# Patient Record
Sex: MALE | Race: BLACK OR AFRICAN AMERICAN | Employment: FULL TIME | URBAN - METROPOLITAN AREA
[De-identification: names, ages, dates, MRNs, and addresses within clinical notes are randomized per-mention and may not be internally consistent; named-entity substitution may affect disease eponyms.]

---

## 2020-03-10 ENCOUNTER — ED HISTORICAL/CONVERTED ENCOUNTER (OUTPATIENT)
Dept: OTHER | Age: 40
End: 2020-03-10

## 2022-07-12 ENCOUNTER — HOSPITAL ENCOUNTER (EMERGENCY)
Age: 42
Discharge: HOME | End: 2022-07-12
Attending: EMERGENCY MEDICINE

## 2022-07-12 ENCOUNTER — APPOINTMENT (OUTPATIENT)
Dept: GENERAL RADIOLOGY | Age: 42
End: 2022-07-12
Attending: EMERGENCY MEDICINE

## 2022-07-12 VITALS
HEART RATE: 78 BPM | TEMPERATURE: 98.5 F | RESPIRATION RATE: 18 BRPM | OXYGEN SATURATION: 99 % | SYSTOLIC BLOOD PRESSURE: 122 MMHG | BODY MASS INDEX: 24.44 KG/M2 | DIASTOLIC BLOOD PRESSURE: 72 MMHG | HEIGHT: 69 IN | WEIGHT: 165 LBS

## 2022-07-12 DIAGNOSIS — U07.1 COVID: Primary | ICD-10-CM

## 2022-07-12 LAB
COVID-19 RAPID TEST, COVR: DETECTED
FLUAV AG NPH QL IA: NEGATIVE
FLUBV AG NOSE QL IA: NEGATIVE
SARS-COV-2, COV2: NORMAL

## 2022-07-12 PROCEDURE — 87635 SARS-COV-2 COVID-19 AMP PRB: CPT

## 2022-07-12 PROCEDURE — 99283 EMERGENCY DEPT VISIT LOW MDM: CPT

## 2022-07-12 PROCEDURE — 87804 INFLUENZA ASSAY W/OPTIC: CPT

## 2022-07-12 PROCEDURE — 71046 X-RAY EXAM CHEST 2 VIEWS: CPT

## 2022-07-12 PROCEDURE — 74011250637 HC RX REV CODE- 250/637: Performed by: EMERGENCY MEDICINE

## 2022-07-12 RX ORDER — ACETAMINOPHEN 325 MG/1
1000 TABLET ORAL
Qty: 20 TABLET | Refills: 0 | Status: SHIPPED | OUTPATIENT
Start: 2022-07-12

## 2022-07-12 RX ORDER — ACETAMINOPHEN 500 MG
1000 TABLET ORAL ONCE
Status: COMPLETED | OUTPATIENT
Start: 2022-07-12 | End: 2022-07-12

## 2022-07-12 RX ORDER — BENZONATATE 100 MG/1
200 CAPSULE ORAL
Qty: 30 CAPSULE | Refills: 0 | Status: SHIPPED | OUTPATIENT
Start: 2022-07-12 | End: 2022-07-19

## 2022-07-12 RX ADMIN — ACETAMINOPHEN 1000 MG: 500 TABLET ORAL at 08:39

## 2022-07-12 NOTE — DISCHARGE INSTRUCTIONS
Thank you! Thank you for allowing me to care for you in the emergency department. I sincerely hope that you are satisfied with your visit today. It is my goal to provide you with excellent care. Below you will find a list of your labs and imaging from your visit today. Should you have any questions regarding these results please do not hesitate to call the emergency department. Labs -     Recent Results (from the past 12 hour(s))   INFLUENZA A & B AG (RAPID TEST)    Collection Time: 07/12/22  7:44 AM   Result Value Ref Range    Influenza A Antigen Negative Negative      Influenza B Antigen Negative Negative     SARS-COV-2    Collection Time: 07/12/22  7:45 AM   Result Value Ref Range    SARS-CoV-2 by PCR Nasopharyngeal     COVID-19 RAPID TEST    Collection Time: 07/12/22  7:45 AM   Result Value Ref Range    COVID-19 rapid test DETECTED (A) Not Detected         Radiologic Studies -   XR CHEST PA LAT   Final Result   No acute abnormality        CT Results  (Last 48 hours)      None          CXR Results  (Last 48 hours)                 07/12/22 0756  XR CHEST PA LAT Final result    Impression:  No acute abnormality       Narrative:  EXAM: XR CHEST PA LAT       INDICATION: congestion and fever       COMPARISON: None. FINDINGS: PA and lateral radiographs of the chest demonstrate clear lungs. The   cardiac and mediastinal contours and pulmonary vascularity are normal. The bones   and soft tissues are within normal limits. If you feel that you have not received excellent quality care or timely care, please ask to speak to the nurse manager. Please choose us in the future for your continued health care needs. ------------------------------------------------------------------------------------------------------------  The exam and treatment you received in the Emergency Department were for an urgent problem and are not intended as complete care.  It is important that you follow-up with a doctor, nurse practitioner, or physician assistant to:  (1) confirm your diagnosis,  (2) re-evaluation of changes in your illness and treatment, and  (3) for ongoing care. If your symptoms become worse or you do not improve as expected and you are unable to reach your usual health care provider, you should return to the Emergency Department. We are available 24 hours a day. Please take your discharge instructions with you when you go to your follow-up appointment. If you have any problem arranging a follow-up appointment, contact the Emergency Department immediately. If a prescription has been provided, please have it filled as soon as possible to prevent a delay in treatment. Read the entire medication instruction sheet provided to you by the pharmacy. If you have any questions or reservations about taking the medication due to side effects or interactions with other medications, please call your primary care physician or contact the ER to speak with the charge nurse. Make an appointment with your family doctor or the physician you were referred to for follow-up of this visit as instructed on your discharge paperwork, as this is a mandatory follow-up. Return to the ER if you are unable to be seen or if you are unable to be seen in a timely manner. If you have any problem arranging the follow-up visit, contact the Emergency Department immediately.

## 2022-07-12 NOTE — ED TRIAGE NOTES
Reports fever and chest congestion for a couple of days. States he has been taking Advil for the fever.

## 2022-07-12 NOTE — ED PROVIDER NOTES
EMERGENCY DEPARTMENT HISTORY AND PHYSICAL EXAM      Date: 7/12/2022  Patient Name: Annabel Jacobs    History of Presenting Illness     Chief Complaint   Patient presents with    Fever    Chest Congestion       History Provided By: Patient    HPI: Annabel Jacobs, 35 y.o. male with a past medical history significant No significant past medical history presents to the ED with cc of cough and fever. Symptoms been going on for about 3 days. He has been taking Advil with improvement of the fever. States the cough is worse first thing in the morning. No shortness of breath, rhinorrhea, sore throat or GI symptoms. No known sick contacts. Is fully vaccinated for COVID. There are no other complaints, changes, or physical findings at this time. PCP: None    No current facility-administered medications on file prior to encounter. No current outpatient medications on file prior to encounter. Past History     Past Medical History:  No past medical history on file. Past Surgical History:  No past surgical history on file. Family History:  No family history on file. Social History:  Social History     Tobacco Use    Smoking status: Not on file    Smokeless tobacco: Not on file   Substance Use Topics    Alcohol use: Not on file    Drug use: Not on file       Allergies:  No Known Allergies      Review of Systems     Review of Systems   Constitutional: Positive for fever. Negative for activity change and appetite change. HENT: Negative for rhinorrhea and sore throat. Eyes: Negative for visual disturbance. Respiratory: Positive for cough. Negative for shortness of breath. Cardiovascular: Negative for chest pain. Gastrointestinal: Negative for abdominal pain, diarrhea, nausea and vomiting. Genitourinary: Negative for dysuria. Musculoskeletal: Negative for arthralgias and myalgias. Skin: Negative for rash. Neurological: Negative for headaches.    Psychiatric/Behavioral: Negative for confusion. All other systems reviewed and are negative. Physical Exam     Physical Exam  Vitals and nursing note reviewed. Constitutional:       General: He is not in acute distress. Appearance: Normal appearance. HENT:      Head: Normocephalic and atraumatic. Eyes:      Pupils: Pupils are equal, round, and reactive to light. Cardiovascular:      Rate and Rhythm: Normal rate and regular rhythm. Pulses: Normal pulses. Heart sounds: Normal heart sounds. Pulmonary:      Effort: Pulmonary effort is normal.      Breath sounds: Normal breath sounds. Musculoskeletal:         General: No swelling or deformity. Skin:     Coloration: Skin is not pale. Findings: No rash. Neurological:      General: No focal deficit present. Mental Status: He is alert and oriented to person, place, and time. Psychiatric:         Mood and Affect: Mood normal.         Behavior: Behavior normal.         Lab and Diagnostic Study Results     Labs -     Recent Results (from the past 12 hour(s))   INFLUENZA A & B AG (RAPID TEST)    Collection Time: 07/12/22  7:44 AM   Result Value Ref Range    Influenza A Antigen Negative Negative      Influenza B Antigen Negative Negative     SARS-COV-2    Collection Time: 07/12/22  7:45 AM   Result Value Ref Range    SARS-CoV-2 by PCR Nasopharyngeal     COVID-19 RAPID TEST    Collection Time: 07/12/22  7:45 AM   Result Value Ref Range    COVID-19 rapid test DETECTED (A) Not Detected         Radiologic Studies -   @lastxrresult@  CT Results  (Last 48 hours)    None        CXR Results  (Last 48 hours)               07/12/22 0756  XR CHEST PA LAT Final result    Impression:  No acute abnormality       Narrative:  EXAM: XR CHEST PA LAT       INDICATION: congestion and fever       COMPARISON: None. FINDINGS: PA and lateral radiographs of the chest demonstrate clear lungs.  The   cardiac and mediastinal contours and pulmonary vascularity are normal. The bones   and soft tissues are within normal limits. Medical Decision Making   - I am the first provider for this patient. - I reviewed the vital signs, available nursing notes, past medical history, past surgical history, family history and social history. - Initial assessment performed. The patients presenting problems have been discussed, and they are in agreement with the care plan formulated and outlined with them. I have encouraged them to ask questions as they arise throughout their visit. Vital Signs-Reviewed the patient's vital signs. Patient Vitals for the past 12 hrs:   Temp Pulse Resp BP SpO2   07/12/22 0737 98.5 °F (36.9 °C) 78 18 122/72 99 %       Records Reviewed: Nursing Notes      ED Course:     ED Course as of 07/12/22 0951   Tue Jul 12, 2022   186 43-year-old male presents for evaluation for 3 days of cough and fever. Also has body aches. No sick contacts. Has been taking Advil with improvement of his temperature. Last dose was this morning. On exam he is well-appearing with normal vital signs. Lungs are clear to auscultation. Differential diagnosis includes viral syndrome including COVID or flu versus pneumonia versus other viral syndrome. Getting a chest x-ray as well as a COVID and flu test.  Disposition as per clinical course. Anticipate discharged home with outpatient follow-up. [LW]   0122 CXR unremarkable. [LW]   Santos. Discharged to home w isolation and return precautions. [LW]      ED Course User Index  [LW] Phuc Christensen MD         Disposition   Disposition: DC- Adult Discharges: All of the diagnostic tests were reviewed and questions answered. Diagnosis, care plan and treatment options were discussed. The patient understands the instructions and will follow up as directed. The patients results have been reviewed with them. They have been counseled regarding their diagnosis.   The patient verbally convey understanding and agreement of the signs, symptoms, diagnosis, treatment and prognosis and additionally agrees to follow up as recommended with their PCP in 24 - 48 hours. They also agree with the care-plan and convey that all of their questions have been answered. I have also put together some discharge instructions for them that include: 1) educational information regarding their diagnosis, 2) how to care for their diagnosis at home, as well a 3) list of reasons why they would want to return to the ED prior to their follow-up appointment, should their condition change. Discharged    DISCHARGE PLAN:  1. There are no discharge medications for this patient. 2.   Follow-up Information     Follow up With Specialties Details Why [facility] EMERGENCY DEPT Emergency Medicine  As needed [address] [address]  [address] [facility]    [address] [address]  [phone]        3. Return to ED if worse   4. Current Discharge Medication List      START taking these medications    Details   acetaminophen (TYLENOL) 325 mg tablet Take 3 Tablets by mouth every six (6) hours as needed for Pain or Fever. Qty: 20 Tablet, Refills: 0  Start date: 7/12/2022      benzonatate (Tessalon Perles) 100 mg capsule Take 2 Capsules by mouth three (3) times daily as needed for Cough for up to 7 days. Qty: 30 Capsule, Refills: 0  Start date: 7/12/2022, End date: 7/19/2022               Diagnosis     Clinical Impression:   1. COVID        Attestations:    Rosalind King MD    Please note that this dictation was completed with [employer] MD, the computer voice recognition software. Quite often unanticipated grammatical, syntax, homophones, and other interpretive errors are inadvertently transcribed by the computer software. Please disregard these errors. Please excuse any errors that have escaped final proofreading. Thank you.

## 2022-07-12 NOTE — Clinical Note
[facility] EMERGENCY DEPT  [address] [address]  [phone]    Work/School Note    Date: 7/12/2022     To Whom It May concern:    Scooby Eckert was evaluated by the following provider(s):  Attending Provider: Boris Edwards Louann Amaris virus is suspected. Per the CDC guidelines we recommend home isolation until the following conditions are all met:    1. At least five days have passed since symptoms first appeared and/or had a close exposure,   2. After home isolation for five days, wearing a mask around others for the next five days,  3. At least 24 have passed since last fever without the use of fever-reducing medications and  4.  Symptoms (eg cough, shortness of breath) have improved      Sincerely,          Francesca Broussard MD

## 2022-07-13 ENCOUNTER — PATIENT OUTREACH (OUTPATIENT)
Dept: CASE MANAGEMENT | Age: 42
End: 2022-07-13

## 2022-07-13 NOTE — PROGRESS NOTES
Home number listed is wrong, its a business and LM on emergency contact  Second attempt made  resolved

## 2024-08-15 ENCOUNTER — APPOINTMENT (OUTPATIENT)
Facility: HOSPITAL | Age: 44
End: 2024-08-15

## 2024-08-15 ENCOUNTER — HOSPITAL ENCOUNTER (EMERGENCY)
Facility: HOSPITAL | Age: 44
Discharge: HOME | End: 2024-08-15
Attending: STUDENT IN AN ORGANIZED HEALTH CARE EDUCATION/TRAINING PROGRAM

## 2024-08-15 VITALS
OXYGEN SATURATION: 100 % | BODY MASS INDEX: 21.92 KG/M2 | TEMPERATURE: 98.1 F | SYSTOLIC BLOOD PRESSURE: 115 MMHG | HEIGHT: 69 IN | HEART RATE: 71 BPM | DIASTOLIC BLOOD PRESSURE: 82 MMHG | WEIGHT: 148 LBS | RESPIRATION RATE: 20 BRPM

## 2024-08-15 DIAGNOSIS — R07.89 ATYPICAL CHEST PAIN: Primary | ICD-10-CM

## 2024-08-15 LAB
ALBUMIN SERPL-MCNC: 4.1 G/DL (ref 3.5–5)
ALBUMIN/GLOB SERPL: 1.2 (ref 1.1–2.2)
ALP SERPL-CCNC: 92 U/L (ref 45–117)
ALT SERPL-CCNC: 28 U/L (ref 12–78)
ANION GAP SERPL CALC-SCNC: 3 MMOL/L (ref 5–15)
AST SERPL W P-5'-P-CCNC: 29 U/L (ref 15–37)
BASOPHILS # BLD: 0.1 K/UL (ref 0–0.1)
BASOPHILS NFR BLD: 2 % (ref 0–1)
BILIRUB SERPL-MCNC: 0.3 MG/DL (ref 0.2–1)
BUN SERPL-MCNC: 13 MG/DL (ref 6–20)
BUN/CREAT SERPL: 11 (ref 12–20)
CA-I BLD-MCNC: 9.2 MG/DL (ref 8.5–10.1)
CHLORIDE SERPL-SCNC: 109 MMOL/L (ref 97–108)
CO2 SERPL-SCNC: 27 MMOL/L (ref 21–32)
CREAT SERPL-MCNC: 1.23 MG/DL (ref 0.7–1.3)
DIFFERENTIAL METHOD BLD: ABNORMAL
EOSINOPHIL # BLD: 0.1 K/UL (ref 0–0.4)
EOSINOPHIL NFR BLD: 2 % (ref 0–7)
ERYTHROCYTE [DISTWIDTH] IN BLOOD BY AUTOMATED COUNT: 14.1 % (ref 11.5–14.5)
GLOBULIN SER CALC-MCNC: 3.5 G/DL (ref 2–4)
GLUCOSE SERPL-MCNC: 114 MG/DL (ref 65–100)
HCT VFR BLD AUTO: 44.1 % (ref 36.6–50.3)
HGB BLD-MCNC: 15.2 G/DL (ref 12.1–17)
IMM GRANULOCYTES # BLD AUTO: 0 K/UL (ref 0–0.04)
IMM GRANULOCYTES NFR BLD AUTO: 0 % (ref 0–0.5)
LYMPHOCYTES # BLD: 1.8 K/UL (ref 0.8–3.5)
LYMPHOCYTES NFR BLD: 33 % (ref 12–49)
MCH RBC QN AUTO: 30.5 PG (ref 26–34)
MCHC RBC AUTO-ENTMCNC: 34.5 G/DL (ref 30–36.5)
MCV RBC AUTO: 88.4 FL (ref 80–99)
MONOCYTES # BLD: 0.6 K/UL (ref 0–1)
MONOCYTES NFR BLD: 11 % (ref 5–13)
NEUTS SEG # BLD: 2.8 K/UL (ref 1.8–8)
NEUTS SEG NFR BLD: 52 % (ref 32–75)
NRBC # BLD: 0 K/UL (ref 0–0.01)
NRBC BLD-RTO: 0 PER 100 WBC
PLATELET # BLD AUTO: 221 K/UL (ref 150–400)
PMV BLD AUTO: 10.4 FL (ref 8.9–12.9)
POTASSIUM SERPL-SCNC: 4.1 MMOL/L (ref 3.5–5.1)
PROT SERPL-MCNC: 7.6 G/DL (ref 6.4–8.2)
RBC # BLD AUTO: 4.99 M/UL (ref 4.1–5.7)
SODIUM SERPL-SCNC: 139 MMOL/L (ref 136–145)
TROPONIN I SERPL HS-MCNC: <4 NG/L (ref 0–76)
WBC # BLD AUTO: 5.4 K/UL (ref 4.1–11.1)

## 2024-08-15 PROCEDURE — 99285 EMERGENCY DEPT VISIT HI MDM: CPT

## 2024-08-15 PROCEDURE — 6360000002 HC RX W HCPCS: Performed by: STUDENT IN AN ORGANIZED HEALTH CARE EDUCATION/TRAINING PROGRAM

## 2024-08-15 PROCEDURE — 80053 COMPREHEN METABOLIC PANEL: CPT

## 2024-08-15 PROCEDURE — 93005 ELECTROCARDIOGRAM TRACING: CPT | Performed by: STUDENT IN AN ORGANIZED HEALTH CARE EDUCATION/TRAINING PROGRAM

## 2024-08-15 PROCEDURE — 85025 COMPLETE CBC W/AUTO DIFF WBC: CPT

## 2024-08-15 PROCEDURE — 96374 THER/PROPH/DIAG INJ IV PUSH: CPT

## 2024-08-15 PROCEDURE — 36415 COLL VENOUS BLD VENIPUNCTURE: CPT

## 2024-08-15 PROCEDURE — 84484 ASSAY OF TROPONIN QUANT: CPT

## 2024-08-15 PROCEDURE — 71045 X-RAY EXAM CHEST 1 VIEW: CPT

## 2024-08-15 RX ORDER — ACETAMINOPHEN 500 MG
1000 TABLET ORAL
Status: DISCONTINUED | OUTPATIENT
Start: 2024-08-15 | End: 2024-08-15

## 2024-08-15 RX ORDER — KETOROLAC TROMETHAMINE 15 MG/ML
15 INJECTION, SOLUTION INTRAMUSCULAR; INTRAVENOUS
Status: COMPLETED | OUTPATIENT
Start: 2024-08-15 | End: 2024-08-15

## 2024-08-15 RX ADMIN — KETOROLAC TROMETHAMINE 15 MG: 15 INJECTION, SOLUTION INTRAMUSCULAR; INTRAVENOUS at 11:12

## 2024-08-15 ASSESSMENT — PAIN SCALES - GENERAL
PAINLEVEL_OUTOF10: 0
PAINLEVEL_OUTOF10: 0

## 2024-08-15 ASSESSMENT — PAIN - FUNCTIONAL ASSESSMENT
PAIN_FUNCTIONAL_ASSESSMENT: NONE - DENIES PAIN
PAIN_FUNCTIONAL_ASSESSMENT: 0-10

## 2024-08-15 ASSESSMENT — LIFESTYLE VARIABLES
HOW MANY STANDARD DRINKS CONTAINING ALCOHOL DO YOU HAVE ON A TYPICAL DAY: PATIENT DOES NOT DRINK
HOW OFTEN DO YOU HAVE A DRINK CONTAINING ALCOHOL: NEVER

## 2024-08-15 ASSESSMENT — HEART SCORE: ECG: NORMAL

## 2024-08-15 NOTE — DISCHARGE INSTRUCTIONS
Thank you for choosing our Emergency Department for your care.  It is our privilege to care for you in your time of need.  In the next several days, you may receive a survey via email or mailed to your home about your experience with our team.  We would greatly appreciate you taking a few minutes to complete the survey, as we use this information to learn what we have done well and what we could be doing better. Thank you for trusting us with your care!    Below you will find a list of your tests from today's visit.   Labs  Recent Results (from the past 12 hour(s))   CBC with Auto Differential    Collection Time: 08/15/24 10:21 AM   Result Value Ref Range    WBC 5.4 4.1 - 11.1 K/uL    RBC 4.99 4.10 - 5.70 M/uL    Hemoglobin 15.2 12.1 - 17.0 g/dL    Hematocrit 44.1 36.6 - 50.3 %    MCV 88.4 80.0 - 99.0 FL    MCH 30.5 26.0 - 34.0 PG    MCHC 34.5 30.0 - 36.5 g/dL    RDW 14.1 11.5 - 14.5 %    Platelets 221 150 - 400 K/uL    MPV 10.4 8.9 - 12.9 FL    Nucleated RBCs 0.0 0.0  WBC    nRBC 0.00 0.00 - 0.01 K/uL    Neutrophils % 52 32 - 75 %    Lymphocytes % 33 12 - 49 %    Monocytes % 11 5 - 13 %    Eosinophils % 2 0 - 7 %    Basophils % 2 (H) 0 - 1 %    Immature Granulocytes % 0 0 - 0.5 %    Neutrophils Absolute 2.8 1.8 - 8.0 K/UL    Lymphocytes Absolute 1.8 0.8 - 3.5 K/UL    Monocytes Absolute 0.6 0.0 - 1.0 K/UL    Eosinophils Absolute 0.1 0.0 - 0.4 K/UL    Basophils Absolute 0.1 0.0 - 0.1 K/UL    Immature Granulocytes Absolute 0.0 0.00 - 0.04 K/UL    Differential Type AUTOMATED     Comprehensive Metabolic Panel w/ Reflex to MG    Collection Time: 08/15/24 10:21 AM   Result Value Ref Range    Sodium 139 136 - 145 mmol/L    Potassium 4.1 3.5 - 5.1 mmol/L    Chloride 109 (H) 97 - 108 mmol/L    CO2 27 21 - 32 mmol/L    Anion Gap 3 (L) 5 - 15 mmol/L    Glucose 114 (H) 65 - 100 mg/dL    BUN 13 6 - 20 mg/dL    Creatinine 1.23 0.70 - 1.30 mg/dL    BUN/Creatinine Ratio 11 (L) 12 - 20      Est, Glom Filt Rate 73 >60

## 2024-08-15 NOTE — ED PROVIDER NOTES
IntraVENous Given 8/15/24 1112)       CONSULTS: (Who and What was discussed)  None     Social Determinants affecting Dx or Tx: Patient lacks a PCP. Given PCP/Free clinic resources.    PROCEDURES   Unless otherwise noted above, none  Procedures      CRITICAL CARE TIME   Patient does not meet Critical Care Time, 0 minutes    ED FINAL IMPRESSION     1. Atypical chest pain          DISPOSITION/PLAN   DISPOSITION Decision To Discharge 08/15/2024 12:05:50 PM  Condition at Disposition: Good    Discharge Note: The patient is stable for discharge home. The signs, symptoms, diagnosis, and discharge instructions have been discussed, understanding conveyed, and agreed upon. The patient is to follow up as recommended or return to ER should their symptoms worsen.      PATIENT REFERRED TO:  Isak Judge MD  [address]   [facility] [address] [phone]      Primary Care Doctor    Franck Rubio MD  [address] [facility] [address] [phone]      Cardiologist        DISCHARGE MEDICATIONS:     Medication List        ASK your doctor about these medications      acetaminophen 325 MG tablet  Commonly known as: TYLENOL                DISCONTINUED MEDICATIONS:  Current Discharge Medication List          I am the Primary Clinician of Record. Johnny Guo MD (electronically signed)    (Please note that parts of this dictation were completed with voice recognition software. Quite often unanticipated grammatical, syntax, homophones, and other interpretive errors are inadvertently transcribed by the computer software. Please disregards these errors. Please excuse any errors that have escaped final proofreading.)        Johnny Guo MD  08/15/24 [id number]       Johnny Guo MD  08/15/24 1204

## 2024-08-15 NOTE — ED TRIAGE NOTES
C/o chest pain that radiates to left arm up into shoulder and up into head leading to pressure feeling in head. Reports that this has happened before, but gotten worse. Also reports feeling dizzy

## 2024-08-16 LAB
EKG ATRIAL RATE: 90 BPM
EKG DIAGNOSIS: NORMAL
EKG P AXIS: -13 DEGREES
EKG P-R INTERVAL: 150 MS
EKG Q-T INTERVAL: 340 MS
EKG QRS DURATION: 94 MS
EKG QTC CALCULATION (BAZETT): 415 MS
EKG R AXIS: -17 DEGREES
EKG T AXIS: -6 DEGREES
EKG VENTRICULAR RATE: 90 BPM